# Patient Record
Sex: FEMALE | Race: WHITE
[De-identification: names, ages, dates, MRNs, and addresses within clinical notes are randomized per-mention and may not be internally consistent; named-entity substitution may affect disease eponyms.]

---

## 2017-03-20 ENCOUNTER — HOSPITAL ENCOUNTER (OUTPATIENT)
Dept: HOSPITAL 45 - C.LAB | Age: 47
Discharge: HOME | End: 2017-03-20
Attending: FAMILY MEDICINE
Payer: COMMERCIAL
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
## 2017-03-20 DIAGNOSIS — E55.9: Primary | ICD-10-CM

## 2017-03-20 DIAGNOSIS — T14.8: ICD-10-CM

## 2017-03-20 DIAGNOSIS — E88.81: ICD-10-CM

## 2017-03-20 LAB
CHOLEST/HDLC SERPL: 4.1 {RATIO}
EST. AVERAGE GLUCOSE BLD GHB EST-MCNC: 126 MG/DL
GLUCOSE UR QL: 45 MG/DL
KETONES UR QL STRIP: 88 MG/DL
LYME DISEASE AB IGG: (no result)
LYME DISEASE AB IGM: (no result)
NITRITE UR QL STRIP: 265 MG/DL (ref 0–150)
PH UR: 186 MG/DL (ref 0–200)
VERY LOW DENSITY LIPOPROT CALC: 53 MG/DL

## 2017-04-08 ENCOUNTER — HOSPITAL ENCOUNTER (EMERGENCY)
Dept: HOSPITAL 45 - C.EDB | Age: 47
Discharge: HOME | End: 2017-04-08
Payer: COMMERCIAL

## 2017-04-08 VITALS
BODY MASS INDEX: 33.09 KG/M2 | WEIGHT: 198.64 LBS | WEIGHT: 198.64 LBS | BODY MASS INDEX: 33.09 KG/M2 | HEIGHT: 65 IN | HEIGHT: 65 IN

## 2017-04-08 VITALS
DIASTOLIC BLOOD PRESSURE: 91 MMHG | HEART RATE: 91 BPM | SYSTOLIC BLOOD PRESSURE: 138 MMHG | TEMPERATURE: 97.88 F | OXYGEN SATURATION: 97 %
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
## 2017-04-08 DIAGNOSIS — Z79.899: ICD-10-CM

## 2017-04-08 DIAGNOSIS — Z80.9: ICD-10-CM

## 2017-04-08 DIAGNOSIS — T81.4XXA: Primary | ICD-10-CM

## 2017-04-08 DIAGNOSIS — Z85.3: ICD-10-CM

## 2017-04-08 DIAGNOSIS — Z85.820: ICD-10-CM

## 2017-04-08 LAB
EOSINOPHIL NFR BLD AUTO: 159 K/UL (ref 130–400)
HCT VFR BLD CALC: 38 % (ref 37–47)
MCH RBC QN AUTO: 30.4 PG (ref 25–34)
MCHC RBC AUTO-ENTMCNC: 34.7 G/DL (ref 32–36)
MCV RBC AUTO: 87.6 FL (ref 80–100)
PMV BLD AUTO: 10 FL (ref 7.4–10.4)
RBC # BLD AUTO: 4.34 M/UL (ref 4.2–5.4)
WBC # BLD AUTO: 6.33 K/UL (ref 4.8–10.8)

## 2017-04-08 NOTE — EMERGENCY ROOM VISIT NOTE
History


Report prepared by Bryce:  Brayan Coon


Under the Supervision of:  Dr. Asad Aviles M.D.


First contact with patient:  22:18


Chief Complaint:  WOUND INFECTION


Stated Complaint:  SURGERY SITE INFECTION, ITCHY, HOT & RED


Nursing Triage Summary:  


see triage note





History of Present Illness


The patient is a 46 year old female who presents to the Emergency Room with 

complaints of a wound infection on her left arm that began recently. Two days 

ago the patient had a spot of melanoma removed on her left arm. Since then, she 

has noticed that the area has become erythematous and mildly painful. She 

thought it was secondary for her many allergies to adhesives, but the erythema 

has begun to spread up and down her arm. She denies any fevers, emesis, or loss 

of consciousness. She has been experiencing chills and heat flashes. She has a 

past medical history of breast cancer.





   Source of History:  patient


   Onset:  recently


   Position:  arm (left)


   Symptom Intensity:  mild


   Quality:  dull


   Timing:  worsening


   Associated Symptoms:  + chills, No LOC, No fevers, No vomiting





Review of Systems


See HPI for pertinent positives & negatives. A total of 10 systems reviewed and 

were otherwise negative.





Past Medical & Surgical


Medical Problems:


(1) Carcinoma of breast


(2) Urinary retention








Family History





FHx: cancer





Social History


Smoking Status:  Never Smoker


Smokeless Tobacco Use:  No


Drug Use:  none


Marital Status:  


Housing Status:  lives with family


Occupation Status:  employed





Current/Historical Medications


Scheduled


Acetaminophen (Tylenol), 500-1,000 MG PO Q6HR PRN


Ascorbic Acid (Vitamin C *), 500 MG PO QAM


Azelaic Acid (Azelex *), 1 APPLN TOP QAM


Cefdinir (Omnicef), 300 MG PO Q12H


Cetirizine (Zyrtec), 10 MG PO QAM


Doxycycline (Monohydrate) (Doxycycline Monohydrate), 40 MG PO QAM


Magnesium Oxide (Mg Supplement (Magnesium), 500 MG PO QPM


Metformin HCL (Glucophage *), 1,000 MG PO QAM


Multivitamin (Multivitamin), 1 TAB PO QAM


Sulfamethoxazole-Trimethoprim (Bactrim Ds 800MG/160MG), 1 TAB PO BID


[Tamoxifen Citrate], 10 MG PO QAM


[Vitamin D], 4,000 UNITS PO QAM


[klaron cream], 1 APPLN TOP QPM


[proctosol-HC cream], 1 APPLN RE PRN





Scheduled PRN


Albuterol Inhaler (Ventolin Inhaler), 2 PUFFS INH QID PRN for PRN


Ibuprofen Tab (Advil), 400-600 MG PO Q6H PRN for PRN


Nystatin-Triamcinolone (Nystatin/Triamcinolone), 1 APPLN TOP BID PRN for PRN


Valacyclovir (Valtrex), 1,000 MG PO TID PRN for PRN





Allergies


Coded Allergies:  


     Adhesives (Verified  Allergy, Intermediate, REDDENED SKIN, ABRASION LIKE, 3

/1/16)


     Latex1 -Allergic Contact Dermititis (Verified  Allergy, Intermediate, RASH

, 3/1/16)


     Macrolides (Verified  Allergy, Unknown, n/v, 3/1/16)


     Penicillins (Verified  Allergy, Unknown, HIVES, 3/1/16)


     Tramadol (Verified  Adverse Reaction, Intermediate, NAUSEA, 3/1/16)


Uncoded Allergies:  


     TRAMODOL (Adverse Reaction, Intermediate, very confused, N/V, 3/10/16)





Physical Exam


Vital Signs











  Date Time  Temp Pulse Resp B/P Pulse Ox O2 Delivery O2 Flow Rate FiO2


 


4/8/17 23:55 36.6 91 18 138/91 97   


 


4/8/17 23:53  91 18 138/91 97 Room Air  


 


4/8/17 21:40  95 18 144/98 97 Room Air  











Physical Exam


GENERAL: Patient is well appearing and in no acute distress.


HEENT: No acute trauma, normocephalic atraumatic, mucous membranes moist, no 

nasal congestion, no scleral icterus.


NECK: No stridor, no adenopathy, no meningismus, trachea is midline.


LUNGS: No dyspnea. Clear to auscultation and equal bilaterally. No wheeze, no 

rhonchi.


HEART: Regular rate and rhythm.  No murmurs, rubs, gallops appreciated.


ABDOMEN: Soft, nontender, bowel sounds positive, no masses appreciated, no 

peritonitis.


BACK: No midline tenderness, no CVA tenderness


EXTREMITIES: Left arm has cellulitis extending from the elbow to the shoulder 

surrounding a post operative wound which is sutured without exudate or 

fluctuance. No swelling distally. N/V is intact. 


NEUROLOGIC: Alert and oriented, no acute motor or sensory deficits, no focal 

weakness, cranial nerves grossly intact.


SKIN: No rash, no jaundice, no diaphoresis.





Medical Decision & Procedures


Laboratory Results


4/8/17 22:45

















Test


  4/8/17


22:45 4/8/17


22:48


 


Red Blood Count


  4.34 M/uL


(4.2-5.4) 


 


 


Mean Corpuscular Volume


  87.6 fL


() 


 


 


Mean Corpuscular Hemoglobin


  30.4 pg


(25-34) 


 


 


Mean Corpuscular Hemoglobin


Concent 34.7 g/dl


(32-36) 


 


 


RDW Standard Deviation


  42.3 fL


(36.4-46.3) 


 


 


RDW Coefficient of Variation


  13.2 %


(11.5-14.5) 


 


 


Mean Platelet Volume


  10.0 fL


(7.4-10.4) 


 


 


Bedside Glucose


  


  125 mg/dl


(70-90)





Laboratory results as reviewed by me.





Medications Administered











 Medications


  (Trade)  Dose


 Ordered  Sig/William


 Route  Start Time


 Stop Time Status Last Admin


Dose Admin


 


 Sodium Chloride


  (Nss 1000ml)  1,000 ml @ 


 999 mls/hr  Q1H1M STAT


 IV  4/8/17 22:28


 4/8/17 23:28 DC 4/8/17 22:47


999 MLS/HR


 


 Ceftriaxone Sodium


  (Rocephin Inj)  1 gm  NOW  STAT


 IV  4/8/17 23:08


 4/8/17 23:09 DC 4/8/17 23:26


1 GM


 


 Trimethoprim/


 Sulfamethoxazole


  (Septra Ds 800/


 160MG Tab)  1 tab  NOW  ONCE


 PO  4/8/17 23:30


 4/8/17 23:31 DC 4/8/17 23:50


1 TAB











ED Course


2218: The patient was evaluated in room C7. A complete history and physical 

exam was performed.





2228: Ordered Sodium Chloride 1000 ml @ 999 mls/hr IV





2308: Ordered Rocephin Inj 1 gm IV





2330: Ordered Trimethoprim/Sulfamethoxazole 1 tab PO





2342: Reevaluated the patient. Discussed results and discharge instructions: 

She verbalized understanding and agreement.   The patient is ready for 

discharge.





Medical Decision


Differential: Contact Dermatitis, Allergic Reaction, Cellulitis, Abscess, Fungal

, amongst other pathologies entertained.





Pleasant 46 yr old female with cellulitis around surgical site where melanoma 

had recently been removed.  History of infections in this arm due to lymphedema 

post mastectomy.  No evidence of sepsis and wbc looks good.  With her history 

will treat with double abx empirically.  Looks well and otherwise in no 

distress.  Reviewed symptoms requiring return and how to monitor cellulitis.  

Unable to express any exudate from wound and there is no abscess on palpation.





Impression





 Primary Impression:  


 Cellulitis of left upper extremity


 Additional Impression:  


 Postoperative wound cellulitis





Scribe Attestation


The scribe's documentation has been prepared under my direction and personally 

reviewed by me in its entirety. I confirm that the note above accurately 

reflects all work, treatment, procedures, and medical decision making performed 

by me.





Departure Information


Dispostion


Home / Self-Care





Prescriptions





Cefdinir (Omnicef) 300 Mg Cap


300 MG PO Q12H for 7 Days, #14 CAP


   Prov: Asad Aviles M.D.         4/8/17 


Sulfamethoxazole-Trimethoprim (Bactrim Ds 800MG/160MG) 1 Tab Tab


1 TAB PO BID for 7 Days, #14 TAB


   Prov: Asad Aviles M.D.         4/8/17





Referrals


Debo Ambrocio DO (PCP)





Forms


HOME CARE DOCUMENTATION FORM,                                                 

               IMPORTANT VISIT INFORMATION, WORK / SCHOOL INSTRUCTIONS





Patient Instructions


Cellulitis Dc, My [facility]





Problem Qualifiers








 Additional Impression:  


 Postoperative wound cellulitis


 Encounter type:  initial encounter  Qualified Codes:  T81.4XXA - Infection 

following a procedure, initial encounter

## 2017-07-24 ENCOUNTER — HOSPITAL ENCOUNTER (OUTPATIENT)
Dept: HOSPITAL 45 - C.LAB | Age: 47
Discharge: HOME | End: 2017-07-24
Attending: FAMILY MEDICINE
Payer: COMMERCIAL

## 2017-07-24 DIAGNOSIS — E88.81: Primary | ICD-10-CM

## 2017-07-24 DIAGNOSIS — E78.5: ICD-10-CM

## 2017-07-24 LAB
CHOLEST/HDLC SERPL: 3.8 {RATIO}
EST. AVERAGE GLUCOSE BLD GHB EST-MCNC: 120 MG/DL
GLUCOSE UR QL: 46 MG/DL
KETONES UR QL STRIP: 85 MG/DL
NITRITE UR QL STRIP: 231 MG/DL (ref 0–150)
PH UR: 177 MG/DL (ref 0–200)
VERY LOW DENSITY LIPOPROT CALC: 46 MG/DL

## 2017-08-30 NOTE — CODING QUERY MEDICAL NECESSITY
CQSUPPORTING DIAGNOSIS NEEDED





A supporting diagnosis is required for the test/procedure performed on this patient in 
order for us to be reimbursed by the patient's insurance. Please provide a supporting 
diagnosis for the following test/procedure listed below next to the test name along with 
your signature. 



*If there is no additional diagnosis for this patient that would support the following 
test/procedure please document that below next to the test/procedure.



Test(s)/Procedure(s) that require a supporting diagnosis:







DOS   07/24/17       GLYCATED HEMOGLOBIN TEST              LIPID TEST 





Provider Signature:  ______________________________  Date:  _______



Thank you  

Linh Farah

Health Information Management

Phone:  [phone]

Fax:  [phone]



Once completed, please kindly fax back to [phone]



For questions please call [phone]

## 2018-01-25 ENCOUNTER — HOSPITAL ENCOUNTER (OUTPATIENT)
Dept: HOSPITAL 45 - C.LAB1850 | Age: 48
Discharge: HOME | End: 2018-01-25
Attending: NURSE PRACTITIONER
Payer: COMMERCIAL

## 2018-01-25 DIAGNOSIS — R25.2: Primary | ICD-10-CM

## 2018-01-25 LAB
ALBUMIN SERPL-MCNC: 3.7 GM/DL (ref 3.4–5)
ALP SERPL-CCNC: 73 U/L (ref 45–117)
ALT SERPL-CCNC: 41 U/L (ref 12–78)
AST SERPL-CCNC: 31 U/L (ref 15–37)
BASOPHILS # BLD: 0.04 K/UL (ref 0–0.2)
BASOPHILS NFR BLD: 0.9 %
BUN SERPL-MCNC: 16 MG/DL (ref 7–18)
CALCIUM SERPL-MCNC: 9.3 MG/DL (ref 8.5–10.1)
CO2 SERPL-SCNC: 25 MMOL/L (ref 21–32)
CREAT SERPL-MCNC: 0.7 MG/DL (ref 0.6–1.2)
EOS ABS #: 0.06 K/UL (ref 0–0.5)
EOSINOPHIL NFR BLD AUTO: 159 K/UL (ref 130–400)
GLUCOSE SERPL-MCNC: 94 MG/DL (ref 70–99)
HCT VFR BLD CALC: 38.4 % (ref 37–47)
HGB BLD-MCNC: 13 G/DL (ref 12–16)
IG#: 0.01 K/UL (ref 0–0.02)
IMM GRANULOCYTES NFR BLD AUTO: 40.8 %
LYMPHOCYTES # BLD: 1.89 K/UL (ref 1.2–3.4)
MCH RBC QN AUTO: 30.1 PG (ref 25–34)
MCHC RBC AUTO-ENTMCNC: 33.9 G/DL (ref 32–36)
MCV RBC AUTO: 88.9 FL (ref 80–100)
MONO ABS #: 0.32 K/UL (ref 0.11–0.59)
MONOCYTES NFR BLD: 6.9 %
NEUT ABS #: 2.31 K/UL (ref 1.4–6.5)
NEUTROPHILS # BLD AUTO: 1.3 %
NEUTROPHILS NFR BLD AUTO: 49.9 %
PMV BLD AUTO: 9.7 FL (ref 7.4–10.4)
POTASSIUM SERPL-SCNC: 3.9 MMOL/L (ref 3.5–5.1)
PROT SERPL-MCNC: 7 GM/DL (ref 6.4–8.2)
RED CELL DISTRIBUTION WIDTH CV: 12.9 % (ref 11.5–14.5)
RED CELL DISTRIBUTION WIDTH SD: 41.9 FL (ref 36.4–46.3)
SODIUM SERPL-SCNC: 138 MMOL/L (ref 136–145)
WBC # BLD AUTO: 4.63 K/UL (ref 4.8–10.8)

## 2018-03-28 ENCOUNTER — HOSPITAL ENCOUNTER (OUTPATIENT)
Dept: HOSPITAL 45 - C.LAB1850 | Age: 48
Discharge: HOME | End: 2018-03-28
Attending: NURSE PRACTITIONER
Payer: COMMERCIAL

## 2018-03-28 DIAGNOSIS — E55.9: ICD-10-CM

## 2018-03-28 DIAGNOSIS — E83.42: Primary | ICD-10-CM

## 2018-03-28 DIAGNOSIS — E88.81: ICD-10-CM

## 2018-03-28 DIAGNOSIS — E78.5: ICD-10-CM

## 2018-03-28 LAB
ALBUMIN SERPL-MCNC: 3.7 GM/DL (ref 3.4–5)
ALP SERPL-CCNC: 90 U/L (ref 45–117)
ALT SERPL-CCNC: 80 U/L (ref 12–78)
AST SERPL-CCNC: 46 U/L (ref 15–37)
BASOPHILS # BLD: 0.05 K/UL (ref 0–0.2)
BASOPHILS NFR BLD: 1.4 %
BUN SERPL-MCNC: 15 MG/DL (ref 7–18)
CALCIUM SERPL-MCNC: 9.2 MG/DL (ref 8.5–10.1)
CO2 SERPL-SCNC: 26 MMOL/L (ref 21–32)
CREAT SERPL-MCNC: 0.81 MG/DL (ref 0.6–1.2)
CREAT UR-MCNC: 392 MG/DL
EOS ABS #: 0.07 K/UL (ref 0–0.5)
EOSINOPHIL NFR BLD AUTO: 145 K/UL (ref 130–400)
GLUCOSE SERPL-MCNC: 94 MG/DL (ref 70–99)
HCT VFR BLD CALC: 36.5 % (ref 37–47)
HGB BLD-MCNC: 12.6 G/DL (ref 12–16)
IG#: 0.02 K/UL (ref 0–0.02)
IMM GRANULOCYTES NFR BLD AUTO: 31 %
KETONES UR QL STRIP: 83 MG/DL
LYMPHOCYTES # BLD: 1.14 K/UL (ref 1.2–3.4)
MCH RBC QN AUTO: 29.6 PG (ref 25–34)
MCHC RBC AUTO-ENTMCNC: 34.5 G/DL (ref 32–36)
MCV RBC AUTO: 85.9 FL (ref 80–100)
MONO ABS #: 0.42 K/UL (ref 0.11–0.59)
MONOCYTES NFR BLD: 11.4 %
NEUT ABS #: 1.98 K/UL (ref 1.4–6.5)
NEUTROPHILS # BLD AUTO: 1.9 %
NEUTROPHILS NFR BLD AUTO: 53.8 %
PH UR: 161 MG/DL (ref 0–200)
PMV BLD AUTO: 9.4 FL (ref 7.4–10.4)
POTASSIUM SERPL-SCNC: 3.9 MMOL/L (ref 3.5–5.1)
PROT SERPL-MCNC: 7.2 GM/DL (ref 6.4–8.2)
RED CELL DISTRIBUTION WIDTH CV: 12.4 % (ref 11.5–14.5)
RED CELL DISTRIBUTION WIDTH SD: 39.4 FL (ref 36.4–46.3)
SODIUM SERPL-SCNC: 139 MMOL/L (ref 136–145)
WBC # BLD AUTO: 3.68 K/UL (ref 4.8–10.8)

## 2018-03-30 ENCOUNTER — HOSPITAL ENCOUNTER (OUTPATIENT)
Dept: HOSPITAL 45 - C.LAB1850 | Age: 48
Discharge: HOME | End: 2018-03-30
Attending: NURSE PRACTITIONER
Payer: COMMERCIAL

## 2018-03-30 DIAGNOSIS — R74.8: ICD-10-CM

## 2018-03-30 DIAGNOSIS — R21: ICD-10-CM

## 2018-03-30 DIAGNOSIS — R50.9: ICD-10-CM

## 2018-03-30 DIAGNOSIS — J02.9: Primary | ICD-10-CM

## 2018-03-30 LAB
HEP C IGG  13 YRS+OLDER_RFLX: (no result)
MONOSPOT: (no result)

## 2018-04-02 LAB
EBV EA IGG SER-ACNC: 34.1 U/ML
HEPATITIS A IGM TC 51813E: (no result)
HEPATITIS B CORE IGM  TC51854R: (no result)

## 2018-07-23 ENCOUNTER — HOSPITAL ENCOUNTER (OUTPATIENT)
Dept: HOSPITAL 45 - C.LAB1850 | Age: 48
Discharge: HOME | End: 2018-07-23
Attending: NURSE PRACTITIONER
Payer: COMMERCIAL

## 2018-07-23 DIAGNOSIS — R50.9: ICD-10-CM

## 2018-07-23 DIAGNOSIS — R05: Primary | ICD-10-CM

## 2018-07-23 DIAGNOSIS — R53.83: ICD-10-CM

## 2018-07-23 NOTE — DIAGNOSTIC IMAGING REPORT
CHEST 2 VIEWS ROUTINE



CLINICAL HISTORY: [id number] dyspnea



COMPARISON STUDY:  6/27/2014



FINDINGS: Central catheter remains in superior vena cava. Lungs are clear.

Diaphragms are smooth. 



IMPRESSION:  No acute process. 











The above report was generated using voice recognition software.  It may contain

grammatical, syntax or spelling errors.









Electronically signed by:  Manuel Gonzales M.D.

7/23/2018 4:23 PM



Dictated Date/Time:  7/23/2018 4:23 PM

## 2018-08-13 ENCOUNTER — HOSPITAL ENCOUNTER (OUTPATIENT)
Dept: HOSPITAL 45 - C.CTS | Age: 48
Discharge: HOME | End: 2018-08-13
Attending: PHYSICIAN ASSISTANT
Payer: COMMERCIAL

## 2018-08-13 DIAGNOSIS — J32.9: Primary | ICD-10-CM

## 2018-08-13 NOTE — DIAGNOSTIC IMAGING REPORT
Study: Fusion CT sinuses.



HISTORY: Chronic sinusitis.



FINDINGS: Postoperative changes lateral wall right orbit as well as lateral wall

right maxillary sinus.



All major sinuses are clear. The estimated units are patent bilaterally. Orbital

margins are intact.



IMPRESSION: Negative fusion CT of the sinuses. Postoperative changes to the

right maxillofacial region.







Electronically signed by:  Manuel Gonzales M.D.

8/13/2018 1:46 PM



Dictated Date/Time:  8/13/2018 1:45 PM